# Patient Record
Sex: FEMALE | Race: WHITE | NOT HISPANIC OR LATINO | URBAN - METROPOLITAN AREA
[De-identification: names, ages, dates, MRNs, and addresses within clinical notes are randomized per-mention and may not be internally consistent; named-entity substitution may affect disease eponyms.]

---

## 2017-05-22 ENCOUNTER — CONVERSION ENCOUNTER (OUTPATIENT)
Dept: GENERAL RADIOLOGY | Facility: HOSPITAL | Age: 42
End: 2017-05-22

## 2019-04-15 ENCOUNTER — HOSPITAL ENCOUNTER (OUTPATIENT)
Dept: ULTRASOUND IMAGING | Facility: HOSPITAL | Age: 44
Discharge: HOME | End: 2019-04-15

## 2024-05-02 ENCOUNTER — TRANSCRIBE ORDERS (OUTPATIENT)
Dept: ADMINISTRATIVE | Facility: HOSPITAL | Age: 49
End: 2024-05-02
Payer: COMMERCIAL

## 2024-05-02 DIAGNOSIS — N83.292 COMPLEX CYST OF LEFT OVARY: ICD-10-CM

## 2024-05-02 DIAGNOSIS — R93.5 ABNORMAL ABDOMINAL ULTRASOUND: Primary | ICD-10-CM

## 2024-05-02 DIAGNOSIS — R87.610 ATYP SQUAM CELL OF UNDET SIGNFC CYTO SMR CRVX (ASC-US): ICD-10-CM

## 2024-05-02 DIAGNOSIS — R93.5 ABNORMAL CT OF THE ABDOMEN: ICD-10-CM

## 2024-05-08 ENCOUNTER — HOSPITAL ENCOUNTER (OUTPATIENT)
Dept: PET IMAGING | Facility: HOSPITAL | Age: 49
Discharge: HOME | End: 2024-05-08
Payer: COMMERCIAL

## 2024-05-08 DIAGNOSIS — N83.292 COMPLEX CYST OF LEFT OVARY: ICD-10-CM

## 2024-05-08 DIAGNOSIS — R87.610 ATYP SQUAM CELL OF UNDET SIGNFC CYTO SMR CRVX (ASC-US): ICD-10-CM

## 2024-05-08 DIAGNOSIS — R93.5 ABNORMAL CT OF THE ABDOMEN: ICD-10-CM

## 2024-05-08 PROCEDURE — 0 FLUDEOXYGLUCOSE F18 SOLUTION: Performed by: NURSE PRACTITIONER

## 2024-05-08 PROCEDURE — A9552 F18 FDG: HCPCS | Performed by: NURSE PRACTITIONER

## 2024-05-08 PROCEDURE — 78815 PET IMAGE W/CT SKULL-THIGH: CPT

## 2024-05-08 RX ADMIN — FLUDEOXYGLUCOSE F 18 1 DOSE: 200 INJECTION, SOLUTION INTRAVENOUS at 14:09

## 2024-05-09 ENCOUNTER — HOSPITAL ENCOUNTER (OUTPATIENT)
Dept: OTHER | Facility: HOSPITAL | Age: 49
Discharge: HOME | End: 2024-05-09

## 2024-05-21 ENCOUNTER — APPOINTMENT (OUTPATIENT)
Facility: HOSPITAL | Age: 49
End: 2024-05-21
Payer: COMMERCIAL

## 2024-05-21 ENCOUNTER — HOSPITAL ENCOUNTER (EMERGENCY)
Facility: HOSPITAL | Age: 49
Discharge: HOME | End: 2024-05-21
Attending: STUDENT IN AN ORGANIZED HEALTH CARE EDUCATION/TRAINING PROGRAM | Admitting: STUDENT IN AN ORGANIZED HEALTH CARE EDUCATION/TRAINING PROGRAM
Payer: COMMERCIAL

## 2024-05-21 VITALS
HEART RATE: 76 BPM | HEIGHT: 63 IN | RESPIRATION RATE: 18 BRPM | TEMPERATURE: 98.3 F | SYSTOLIC BLOOD PRESSURE: 132 MMHG | BODY MASS INDEX: 31.18 KG/M2 | DIASTOLIC BLOOD PRESSURE: 75 MMHG | OXYGEN SATURATION: 99 % | WEIGHT: 176 LBS

## 2024-05-21 DIAGNOSIS — N83.202 CYST OF LEFT OVARY: Primary | ICD-10-CM

## 2024-05-21 DIAGNOSIS — R10.2 PELVIC PAIN: ICD-10-CM

## 2024-05-21 LAB
ALBUMIN SERPL-MCNC: 4.2 G/DL (ref 3.5–5.2)
ALBUMIN/GLOB SERPL: 1.1 G/DL
ALP SERPL-CCNC: 76 U/L (ref 39–117)
ALT SERPL W P-5'-P-CCNC: 10 U/L (ref 1–33)
ANION GAP SERPL CALCULATED.3IONS-SCNC: 13.5 MMOL/L (ref 5–15)
AST SERPL-CCNC: 23 U/L (ref 1–32)
BACTERIA UR QL AUTO: ABNORMAL /HPF
BASOPHILS # BLD AUTO: 0.06 10*3/MM3 (ref 0–0.2)
BASOPHILS NFR BLD AUTO: 0.7 % (ref 0–1.5)
BILIRUB SERPL-MCNC: 0.3 MG/DL (ref 0–1.2)
BILIRUB UR QL STRIP: NEGATIVE
BUN SERPL-MCNC: 11 MG/DL (ref 6–20)
BUN/CREAT SERPL: 12.8 (ref 7–25)
CALCIUM SPEC-SCNC: 9.2 MG/DL (ref 8.6–10.5)
CHLORIDE SERPL-SCNC: 99 MMOL/L (ref 98–107)
CLARITY UR: ABNORMAL
CO2 SERPL-SCNC: 24.5 MMOL/L (ref 22–29)
COLOR UR: YELLOW
CREAT SERPL-MCNC: 0.86 MG/DL (ref 0.57–1)
DEPRECATED RDW RBC AUTO: 48.9 FL (ref 37–54)
EGFRCR SERPLBLD CKD-EPI 2021: 81.9 ML/MIN/1.73
EOSINOPHIL # BLD AUTO: 0.28 10*3/MM3 (ref 0–0.4)
EOSINOPHIL NFR BLD AUTO: 3.5 % (ref 0.3–6.2)
ERYTHROCYTE [DISTWIDTH] IN BLOOD BY AUTOMATED COUNT: 16.7 % (ref 12.3–15.4)
GLOBULIN UR ELPH-MCNC: 3.8 GM/DL
GLUCOSE SERPL-MCNC: 94 MG/DL (ref 65–99)
GLUCOSE UR STRIP-MCNC: NEGATIVE MG/DL
HCT VFR BLD AUTO: 33.8 % (ref 34–46.6)
HGB BLD-MCNC: 10.8 G/DL (ref 12–15.9)
HGB UR QL STRIP.AUTO: ABNORMAL
HYALINE CASTS UR QL AUTO: ABNORMAL /LPF
IMM GRANULOCYTES # BLD AUTO: 0.01 10*3/MM3 (ref 0–0.05)
IMM GRANULOCYTES NFR BLD AUTO: 0.1 % (ref 0–0.5)
KETONES UR QL STRIP: ABNORMAL
LEUKOCYTE ESTERASE UR QL STRIP.AUTO: ABNORMAL
LYMPHOCYTES # BLD AUTO: 2.09 10*3/MM3 (ref 0.7–3.1)
LYMPHOCYTES NFR BLD AUTO: 25.9 % (ref 19.6–45.3)
MCH RBC QN AUTO: 25.5 PG (ref 26.6–33)
MCHC RBC AUTO-ENTMCNC: 32 G/DL (ref 31.5–35.7)
MCV RBC AUTO: 79.9 FL (ref 79–97)
MONOCYTES # BLD AUTO: 0.61 10*3/MM3 (ref 0.1–0.9)
MONOCYTES NFR BLD AUTO: 7.6 % (ref 5–12)
MUCOUS THREADS URNS QL MICRO: ABNORMAL /HPF
NEUTROPHILS NFR BLD AUTO: 5.01 10*3/MM3 (ref 1.7–7)
NEUTROPHILS NFR BLD AUTO: 62.2 % (ref 42.7–76)
NITRITE UR QL STRIP: NEGATIVE
PH UR STRIP.AUTO: 6 [PH] (ref 5–8)
PLATELET # BLD AUTO: 529 10*3/MM3 (ref 140–450)
PMV BLD AUTO: 10 FL (ref 6–12)
POTASSIUM SERPL-SCNC: 3.5 MMOL/L (ref 3.5–5.2)
PROT SERPL-MCNC: 8 G/DL (ref 6–8.5)
PROT UR QL STRIP: NEGATIVE
RBC # BLD AUTO: 4.23 10*6/MM3 (ref 3.77–5.28)
RBC # UR STRIP: ABNORMAL /HPF
REF LAB TEST METHOD: ABNORMAL
SODIUM SERPL-SCNC: 137 MMOL/L (ref 136–145)
SP GR UR STRIP: >=1.03 (ref 1–1.03)
SQUAMOUS #/AREA URNS HPF: ABNORMAL /HPF
UROBILINOGEN UR QL STRIP: ABNORMAL
WBC # UR STRIP: ABNORMAL /HPF
WBC NRBC COR # BLD AUTO: 8.06 10*3/MM3 (ref 3.4–10.8)

## 2024-05-21 PROCEDURE — 25810000003 SODIUM CHLORIDE 0.9 % SOLUTION

## 2024-05-21 PROCEDURE — 76830 TRANSVAGINAL US NON-OB: CPT

## 2024-05-21 PROCEDURE — 80053 COMPREHEN METABOLIC PANEL: CPT

## 2024-05-21 PROCEDURE — 93976 VASCULAR STUDY: CPT

## 2024-05-21 PROCEDURE — 81001 URINALYSIS AUTO W/SCOPE: CPT

## 2024-05-21 PROCEDURE — 36415 COLL VENOUS BLD VENIPUNCTURE: CPT

## 2024-05-21 PROCEDURE — 99284 EMERGENCY DEPT VISIT MOD MDM: CPT

## 2024-05-21 PROCEDURE — 25010000002 MORPHINE PER 10 MG: Performed by: STUDENT IN AN ORGANIZED HEALTH CARE EDUCATION/TRAINING PROGRAM

## 2024-05-21 PROCEDURE — 85025 COMPLETE CBC W/AUTO DIFF WBC: CPT

## 2024-05-21 PROCEDURE — 96374 THER/PROPH/DIAG INJ IV PUSH: CPT

## 2024-05-21 RX ORDER — LOSARTAN POTASSIUM AND HYDROCHLOROTHIAZIDE 25; 100 MG/1; MG/1
1 TABLET ORAL DAILY
COMMUNITY

## 2024-05-21 RX ORDER — VENLAFAXINE HYDROCHLORIDE 150 MG/1
150 CAPSULE, EXTENDED RELEASE ORAL DAILY
COMMUNITY

## 2024-05-21 RX ORDER — MAGNESIUM CARB/ALUMINUM HYDROX 105-160MG
30 TABLET,CHEWABLE ORAL ONCE
COMMUNITY

## 2024-05-21 RX ORDER — AMOXICILLIN 250 MG
1 CAPSULE ORAL DAILY
COMMUNITY

## 2024-05-21 RX ORDER — BUSPIRONE HYDROCHLORIDE 5 MG/1
5 TABLET ORAL 3 TIMES DAILY
COMMUNITY

## 2024-05-21 RX ORDER — CETIRIZINE HYDROCHLORIDE 10 MG/1
10 TABLET ORAL DAILY
COMMUNITY

## 2024-05-21 RX ORDER — MOMETASONE FUROATE 1 MG/G
1 CREAM TOPICAL DAILY
COMMUNITY

## 2024-05-21 RX ORDER — POTASSIUM CHLORIDE 750 MG/1
10 TABLET, EXTENDED RELEASE ORAL 2 TIMES DAILY
COMMUNITY

## 2024-05-21 RX ADMIN — SODIUM CHLORIDE 1000 ML: 9 INJECTION, SOLUTION INTRAVENOUS at 18:59

## 2024-05-21 RX ADMIN — MORPHINE SULFATE 4 MG: 4 INJECTION, SOLUTION INTRAMUSCULAR; INTRAVENOUS at 19:00

## 2024-05-21 NOTE — FSED PROVIDER NOTE
Subjective   History of Present Illness  Patient is a 49-year-old female complaining of left pelvic pain ongoing for several days has been gradually worsening.  Patient reports that she has an appointment with OB/GYN on Monday, 5/23/2024 for evaluation of left ovarian cyst which has been increasingly growing in size.  She states she believes the cyst has ruptured and she noticed markedly increased pain this date and states she has had previous ovarian cyst ruptures in the past and this feels similar.  She reports some nausea but denies any vomiting describes as a cramping sensation rated 7 out of 10 in the left lower quadrant states pain does not radiate.  Reports spotting despite being postmenopausal but denies any significant vaginal bleeding.  Denies any urinary symptoms denies any diarrhea or constipation.  Denies any fevers, chills, chest pain or shortness of breath.  Review of Systems   Gastrointestinal:  Positive for abdominal pain and nausea.   Genitourinary:  Positive for pelvic pain and vaginal bleeding.   All other systems reviewed and are negative.      Past Medical History:   Diagnosis Date    Hyperlipidemia     Hypertension        No Known Allergies    Past Surgical History:   Procedure Laterality Date    TUMOR EXCISION      multiple locations       History reviewed. No pertinent family history.    Social History     Socioeconomic History    Marital status:    Tobacco Use    Smoking status: Never    Smokeless tobacco: Never   Substance and Sexual Activity    Alcohol use: Yes     Comment: socially    Drug use: Never           Objective   Physical Exam  General: Afebrile, nontoxic, no acute distress  HEENT: Normocephalic, atraumatic, PERRL.   NECK: Trachea midline no sign of trauma, neck supple, no lymphadenopathy.  CHEST: No signs of trauma   HEART:  no rubs or murmurs on auscultation.  LUNGS: Clear lung sounds bilaterally, no wheezes, no stridor.  Equal lung sounds.  ABDOMEN: Soft, left lower  quadrant pain on palpation, no signs of trauma.  MSK: Moving all extremities, No signs of deformity, edema or joint effusion.  SKIN: No rashes, no erythema.  No wounds  NEURO: Awake alert and oriented, answering all questions and following all commands appropriately.  No focal neurodeficits.   Procedures           ED Course  ED Course as of 05/21/24 2012 Tue May 21, 2024   2011 Patient is a 49-year-old female complaining of left pelvic pain ongoing for several days has been gradually worsening.  Patient reports that she has an appointment with OB/GYN on Monday, 5/23/2024 for evaluation of left ovarian cyst which has been increasingly growing in size.  She states she believes the cyst has ruptured and she noticed markedly increased pain this date and states she has had previous ovarian cyst ruptures in the past and this feels similar.  She reports some nausea but denies any vomiting describes as a cramping sensation rated 7 out of 10 in the left lower quadrant states pain does not radiate.  Reports spotting despite being postmenopausal but denies any significant vaginal bleeding.  Denies any urinary symptoms denies any diarrhea or constipation.  Denies any fevers, chills, chest pain or shortness of breath.  Physical exam did show increased pain on palpation of the left lower abdominal quadrant.  Given known cysts and fibroids transvaginal ultrasound was ordered which showed stable cyst and 2 uterine fibroids which are known conditions.  Plan Estrace and morphine patient states her pain feels significant better.  Patient advised to keep her follow-up with OB/GYN but to return to the ER solution or worsen or she feels she is further can evaluation by ER provider. [ZG]      ED Course User Index  [ZG] Carlos Riggs PA-C                                           Medical Decision Making  Problems Addressed:  Cyst of left ovary: complicated acute illness or injury  Pelvic pain: complicated acute illness or  injury    Amount and/or Complexity of Data Reviewed  Labs: ordered.  Radiology: ordered.    Risk  Prescription drug management.        Final diagnoses:   Cyst of left ovary   Pelvic pain       ED Disposition  ED Disposition       ED Disposition   Discharge    Condition   Stable    Comment   --               No follow-up provider specified.       Medication List      No changes were made to your prescriptions during this visit.

## 2024-05-22 NOTE — DISCHARGE INSTRUCTIONS
Patient to keep follow-up appoint with your OB/GYN this Thursday.  Return to the ER if symptoms return or worsen or if any further can evaluation by ER provider.

## 2025-02-06 ENCOUNTER — TRANSCRIBE ORDERS (OUTPATIENT)
Dept: ADMINISTRATIVE | Facility: HOSPITAL | Age: 50
End: 2025-02-06
Payer: COMMERCIAL

## 2025-02-06 DIAGNOSIS — K80.20 CALCULUS OF GALLBLADDER WITHOUT CHOLECYSTITIS WITHOUT OBSTRUCTION: Primary | ICD-10-CM

## 2025-02-20 ENCOUNTER — HOSPITAL ENCOUNTER (OUTPATIENT)
Dept: NUCLEAR MEDICINE | Facility: HOSPITAL | Age: 50
Discharge: HOME | End: 2025-02-20
Payer: COMMERCIAL

## 2025-02-20 DIAGNOSIS — K80.20 CALCULUS OF GALLBLADDER WITHOUT CHOLECYSTITIS WITHOUT OBSTRUCTION: ICD-10-CM

## 2025-02-20 PROCEDURE — 78227 HEPATOBIL SYST IMAGE W/DRUG: CPT

## 2025-02-20 PROCEDURE — A9537 TC99M MEBROFENIN: HCPCS | Performed by: NURSE PRACTITIONER

## 2025-02-20 PROCEDURE — 34310000005 TECHNETIUM TC 99M MEBROFENIN KIT: Performed by: NURSE PRACTITIONER

## 2025-02-20 RX ORDER — KIT FOR THE PREPARATION OF TECHNETIUM TC 99M MEBROFENIN 45 MG/10ML
1 INJECTION, POWDER, LYOPHILIZED, FOR SOLUTION INTRAVENOUS
Status: COMPLETED | OUTPATIENT
Start: 2025-02-20 | End: 2025-02-20

## 2025-02-20 RX ADMIN — MEBROFENIN 1 DOSE: 45 INJECTION, POWDER, LYOPHILIZED, FOR SOLUTION INTRAVENOUS at 09:00

## 2025-05-15 ENCOUNTER — OFFICE VISIT (OUTPATIENT)
Dept: SURGERY | Facility: CLINIC | Age: 50
End: 2025-05-15
Payer: COMMERCIAL

## 2025-05-15 VITALS
RESPIRATION RATE: 20 BRPM | HEART RATE: 94 BPM | HEIGHT: 63 IN | DIASTOLIC BLOOD PRESSURE: 68 MMHG | WEIGHT: 172.1 LBS | SYSTOLIC BLOOD PRESSURE: 128 MMHG | BODY MASS INDEX: 30.49 KG/M2

## 2025-05-15 DIAGNOSIS — K82.4 GALLBLADDER POLYP: Primary | ICD-10-CM

## 2025-05-15 RX ORDER — METOPROLOL TARTRATE 50 MG
50 TABLET ORAL
COMMUNITY
Start: 2025-01-29

## 2025-05-15 RX ORDER — OMEPRAZOLE 40 MG/1
40 CAPSULE, DELAYED RELEASE ORAL DAILY
COMMUNITY

## 2025-05-15 RX ORDER — MELOXICAM 15 MG/1
1 TABLET ORAL DAILY
COMMUNITY
Start: 2025-02-26

## 2025-05-15 RX ORDER — ONDANSETRON 4 MG/1
4 TABLET, ORALLY DISINTEGRATING ORAL
COMMUNITY
Start: 2025-02-07

## 2025-05-15 RX ORDER — TRAZODONE HYDROCHLORIDE 50 MG/1
50 TABLET ORAL DAILY
COMMUNITY
Start: 2025-04-17

## 2025-05-15 NOTE — LETTER
May 16, 2025     GAIL Barreto  [address]  [facility]  [address]  [place] [address]    Patient: Kayleigh Evans   YOB: 1975   Date of Visit: 5/15/2025     Dear GAIL Barreto:       Thank you for referring Kayleigh Evans to me for evaluation. Below are the relevant portions of my assessment and plan of care.    If you have questions, please do not hesitate to call me. I look forward to following Kayleigh along with you.         Sincerely,        Fransisco Tidwell MD        CC: No Recipients    Fransisco Tidwell MD  05/16/25 1359  Sign when Signing Visit  General Surgery/Colorectal Surgery Note    Patient Name:  Kayleigh Evans  YOB: 1975  [id number]    Referring Provider: Melvi Cutler APRN      Patient Care Team:  Melvi Cutler APRN as PCP - General (Nurse Practitioner)    Chief complaint gallbladder    Subjective.     History of present illness:    She has a history of right mid quadrant abdominal pain for 1 year with associated nausea.  It is intermittent.  No relationship to food intake.  No history of ulcer disease.  No NSAID abuse.  No blood thinner use.  No previous abdominal surgery.  No tobacco use.  No recent chest pain.  Ultrasound right upper quadrant 4/17/2025 with 4 mm gallbladder polyp.      History:  Past Medical History:   Diagnosis Date   • Anemia    • Cholelithiasis    • Hyperlipidemia    • Hypertension    • PONV (postoperative nausea and vomiting)        Past Surgical History:   Procedure Laterality Date   • BREAST BIOPSY      Left   • TUMOR EXCISION      multiple locations       Family History   Problem Relation Age of Onset   • Arthritis Mother    • Depression Mother    • Cancer Father    • Hypertension Father    • Cancer Paternal Grandmother        Social History     Tobacco Use   • Smoking status: Never   • Smokeless tobacco: Never   Vaping Use   • Vaping status: Never Used   Substance  Use Topics   • Alcohol use: Not Currently     Comment: socially   • Drug use: Never       Review of Systems  All systems were reviewed and negative except for:   Review of Systems   Constitutional: Negative for chills, fever and unexpected weight loss.   HENT: Negative for congestion, nosebleeds and voice change.    Eyes: Negative for blurred vision, double vision and discharge.   Respiratory: Negative for apnea, chest tightness and shortness of breath.    Cardiovascular: Negative for chest pain and leg swelling.   Gastrointestinal:        See HPI   Endocrine: Negative for cold intolerance and heat intolerance.   Genitourinary: Negative for dysuria, hematuria and urgency.   Musculoskeletal: Negative for back pain, joint swelling and neck pain.   Skin: Negative for color change and dry skin.   Neurological: Negative for dizziness and confusion.   Hematological: Negative for adenopathy.   Psychiatric/Behavioral: Negative for agitation and behavioral problems.     MEDS:  Prior to Admission medications    Medication Sig Start Date End Date Taking? Authorizing Provider   busPIRone (BUSPAR) 5 MG tablet Take 1 tablet by mouth 3 (Three) Times a Day.   Yes Romeo Quinonez MD   venlafaxine XR (EFFEXOR-XR) 150 MG 24 hr capsule Take 1 capsule by mouth Daily.   Yes Romeo Quinonez MD   cetirizine (zyrTEC) 10 MG tablet Take 1 tablet by mouth Daily.  Patient not taking: Reported on 5/15/2025    Romeo Quinonez MD   diclofenac (VOLTAREN) 50 MG EC tablet Take 1 tablet by mouth.  Patient not taking: Reported on 5/15/2025 4/9/25   Romeo Quinnoez MD   losartan-hydrochlorothiazide (HYZAAR) 100-25 MG per tablet Take 1 tablet by mouth Daily.  Patient not taking: Reported on 5/15/2025    Romeo Quinonez MD   magnesium citrate 1.745 GM/30ML solution solution Take 30 mL by mouth 1 (One) Time.  Patient not taking: Reported on 5/15/2025    Romeo Quinonez MD   meloxicam (MOBIC) 15 MG tablet Take 1 tablet by  mouth Daily.  Patient not taking: Reported on 5/15/2025 2/26/25   Romeo Quinonez MD   metoprolol tartrate (LOPRESSOR) 50 MG tablet Take 1 tablet by mouth.  Patient not taking: Reported on 5/15/2025 1/29/25   Romeo Quinonez MD   mometasone (ELOCON) 0.1 % cream Apply 1 Application topically to the appropriate area as directed Daily.  Patient not taking: Reported on 5/15/2025    Romeo Quinonez MD   omeprazole (priLOSEC) 40 MG capsule Take 1 capsule by mouth Daily.  Patient not taking: Reported on 5/15/2025    Romeo Quinonez MD   ondansetron ODT (ZOFRAN-ODT) 4 MG disintegrating tablet Take 1 tablet by mouth.  Patient not taking: Reported on 5/15/2025 2/7/25   Romeo Quinonez MD   Polyethylene Glycol 3350 (GLYCOLAX PO) Take  by mouth.  Patient not taking: Reported on 5/15/2025    Romeo Quinonez MD   potassium chloride (KLOR-CON M10) 10 MEQ CR tablet Take 1 tablet by mouth 2 (Two) Times a Day.  Patient not taking: Reported on 5/15/2025    Romeo Quinonez MD   sennosides-docusate (senna-docusate sodium) 8.6-50 MG per tablet Take 1 tablet by mouth Daily.  Patient not taking: Reported on 5/15/2025    Romeo Quinonez MD   traZODone (DESYREL) 50 MG tablet Take 1 tablet by mouth Daily.  Patient not taking: Reported on 5/15/2025 4/17/25   Romeo Quinonez MD        Allergies:  Nickel    Objective    Vital Signs   Heart Rate:  [94] 94  Resp:  [20] 20  BP: (128)/(68) 128/68    Physical Exam:     General Appearance:    Alert, cooperative, in no acute distress   Head:    Normocephalic, without obvious abnormality, atraumatic   Eyes:          Conjunctivae and sclerae normal, no icterus,     Ears:    Ears appear intact with no abnormalities noted   Throat:   No oral lesions, no thrush, oral mucosa moist   Neck:   No adenopathy, supple, trachea midline, no thyromegaly   Back:     No kyphosis present, no scoliosis present, no skin lesions,      erythema or scars, no tenderness to  "percussion or                   palpation,   range of motion normal   Lungs:     Clear to auscultation,respirations regular, even and                  unlabored    Heart:    Regular rhythm and normal rate, normal S1 and S2, no            murmur, no gallop, no rub, no click   Chest Wall:    No abnormalities observed   Abdomen:     Normal bowel sounds, no masses, no organomegaly, soft        non-tender, non-distended, no guarding, no rebound                tenderness   Rectal:        Extremities:   Moves all extremities well, no edema, no cyanosis, no             redness   Pulses:   Pulses palpable and equal bilaterally   Skin:   No bleeding, bruising or rash   Lymph nodes:   No palpable adenopathy   Neurologic:   A/o x 4 with no deficits       Results Review:   {Results Review:83804::\"I reviewed the patient's new clinical results.\"    LABS/IMAGING:  Results for orders placed or performed during the hospital encounter of 05/21/24   Comprehensive Metabolic Panel    Collection Time: 05/21/24  6:32 PM    Specimen: Blood   Result Value Ref Range    Glucose 94 65 - 99 mg/dL    BUN 11 6 - 20 mg/dL    Creatinine 0.86 0.57 - 1.00 mg/dL    Sodium 137 136 - 145 mmol/L    Potassium 3.5 3.5 - 5.2 mmol/L    Chloride 99 98 - 107 mmol/L    CO2 24.5 22.0 - 29.0 mmol/L    Calcium 9.2 8.6 - 10.5 mg/dL    Total Protein 8.0 6.0 - 8.5 g/dL    Albumin 4.2 3.5 - 5.2 g/dL    ALT (SGPT) 10 1 - 33 U/L    AST (SGOT) 23 1 - 32 U/L    Alkaline Phosphatase 76 39 - 117 U/L    Total Bilirubin 0.3 0.0 - 1.2 mg/dL    Globulin 3.8 gm/dL    A/G Ratio 1.1 g/dL    BUN/Creatinine Ratio 12.8 7.0 - 25.0    Anion Gap 13.5 5.0 - 15.0 mmol/L    eGFR 81.9 >60.0 mL/min/1.73   Urinalysis With Microscopic If Indicated (No Culture) - Urine, Clean Catch    Collection Time: 05/21/24  6:32 PM    Specimen: Urine, Clean Catch   Result Value Ref Range    Color, UA Yellow Yellow, Straw    Appearance, UA Slightly Cloudy (A) Clear    pH, UA 6.0 5.0 - 8.0    Specific Gravity, " UA >=1.030 1.005 - 1.030    Glucose, UA Negative Negative    Ketones, UA 15 mg/dL (1+) (A) Negative    Bilirubin, UA Negative Negative    Blood, UA Large (3+) (A) Negative    Protein, UA Negative Negative    Leuk Esterase, UA Small (1+) (A) Negative    Nitrite, UA Negative Negative    Urobilinogen, UA 0.2 E.U./dL 0.2 - 1.0 E.U./dL   CBC Auto Differential    Collection Time: 05/21/24  6:32 PM    Specimen: Blood   Result Value Ref Range    WBC 8.06 3.40 - 10.80 10*3/mm3    RBC 4.23 3.77 - 5.28 10*6/mm3    Hemoglobin 10.8 (L) 12.0 - 15.9 g/dL    Hematocrit 33.8 (L) 34.0 - 46.6 %    MCV 79.9 79.0 - 97.0 fL    MCH 25.5 (L) 26.6 - 33.0 pg    MCHC 32.0 31.5 - 35.7 g/dL    RDW 16.7 (H) 12.3 - 15.4 %    RDW-SD 48.9 37.0 - 54.0 fl    MPV 10.0 6.0 - 12.0 fL    Platelets 529 (H) 140 - 450 10*3/mm3    Neutrophil % 62.2 42.7 - 76.0 %    Lymphocyte % 25.9 19.6 - 45.3 %    Monocyte % 7.6 5.0 - 12.0 %    Eosinophil % 3.5 0.3 - 6.2 %    Basophil % 0.7 0.0 - 1.5 %    Immature Grans % 0.1 0.0 - 0.5 %    Neutrophils, Absolute 5.01 1.70 - 7.00 10*3/mm3    Lymphocytes, Absolute 2.09 0.70 - 3.10 10*3/mm3    Monocytes, Absolute 0.61 0.10 - 0.90 10*3/mm3    Eosinophils, Absolute 0.28 0.00 - 0.40 10*3/mm3    Basophils, Absolute 0.06 0.00 - 0.20 10*3/mm3    Immature Grans, Absolute 0.01 0.00 - 0.05 10*3/mm3   Urinalysis, Microscopic Only - Urine, Clean Catch    Collection Time: 05/21/24  6:32 PM    Specimen: Urine, Clean Catch   Result Value Ref Range    RBC, UA 3-5 (A) None Seen, 0-2 /HPF    WBC, UA 11-20 (A) None Seen, 0-2 /HPF    Bacteria, UA 2+ (A) None Seen /HPF    Squamous Epithelial Cells, UA 7-12 (A) None Seen, 0-2 /HPF    Hyaline Casts, UA None Seen None Seen /LPF    Mucus, UA Large/3+ (A) None Seen, Trace /HPF    Methodology Manual Light Microscopy         Result Review: Labs  Result Review  Imaging  Med Tab  Media     Assessment & Plan    Gallbladder polyp    Discussion with patient.  I reviewed her imaging with results  mentioned above.  We discussed surveillance with ultrasound in 6 months versus removing her gallbladder.  She wishes to proceed with surveillance imaging.  Ultrasound for right upper quadrant ordered for 6 months.  I explained to her if she develops right upper quadrant pain worse with eating in the meantime to follow-up with me and we will consider surgery.  If the polyp increases in size I will recommend surgery.  All questions answered.  She agrees with the plan.  Thank you for the consult.              This document has been electronically signed by Fransisco Tidwell MD  May 16, 2025 13:56 EDT

## 2025-05-16 NOTE — PROGRESS NOTES
General Surgery/Colorectal Surgery Note    Patient Name:  Kayleigh Evans  YOB: 1975  [id number]    Referring Provider: Melvi Cutler APRN      Patient Care Team:  Melvi Cutler APRN as PCP - General (Nurse Practitioner)    Chief complaint gallbladder    Subjective .     History of present illness:    She has a history of right mid quadrant abdominal pain for 1 year with associated nausea.  It is intermittent.  No relationship to food intake.  No history of ulcer disease.  No NSAID abuse.  No blood thinner use.  No previous abdominal surgery.  No tobacco use.  No recent chest pain.  Ultrasound right upper quadrant 4/17/2025 with 4 mm gallbladder polyp.      History:  Past Medical History:   Diagnosis Date    Anemia     Cholelithiasis     Hyperlipidemia     Hypertension     PONV (postoperative nausea and vomiting)        Past Surgical History:   Procedure Laterality Date    BREAST BIOPSY      Left    TUMOR EXCISION      multiple locations       Family History   Problem Relation Age of Onset    Arthritis Mother     Depression Mother     Cancer Father     Hypertension Father     Cancer Paternal Grandmother        Social History     Tobacco Use    Smoking status: Never    Smokeless tobacco: Never   Vaping Use    Vaping status: Never Used   Substance Use Topics    Alcohol use: Not Currently     Comment: socially    Drug use: Never       Review of Systems  All systems were reviewed and negative except for:   Review of Systems   Constitutional: Negative for chills, fever and unexpected weight loss.   HENT: Negative for congestion, nosebleeds and voice change.    Eyes: Negative for blurred vision, double vision and discharge.   Respiratory: Negative for apnea, chest tightness and shortness of breath.    Cardiovascular: Negative for chest pain and leg swelling.   Gastrointestinal:        See HPI   Endocrine: Negative for cold intolerance and heat intolerance.   Genitourinary: Negative for  dysuria, hematuria and urgency.   Musculoskeletal: Negative for back pain, joint swelling and neck pain.   Skin: Negative for color change and dry skin.   Neurological: Negative for dizziness and confusion.   Hematological: Negative for adenopathy.   Psychiatric/Behavioral: Negative for agitation and behavioral problems.     MEDS:  Prior to Admission medications    Medication Sig Start Date End Date Taking? Authorizing Provider   busPIRone (BUSPAR) 5 MG tablet Take 1 tablet by mouth 3 (Three) Times a Day.   Yes Romeo Quinonez MD   venlafaxine XR (EFFEXOR-XR) 150 MG 24 hr capsule Take 1 capsule by mouth Daily.   Yes Romeo Quinonez MD   cetirizine (zyrTEC) 10 MG tablet Take 1 tablet by mouth Daily.  Patient not taking: Reported on 5/15/2025    Romeo Quinonez MD   diclofenac (VOLTAREN) 50 MG EC tablet Take 1 tablet by mouth.  Patient not taking: Reported on 5/15/2025 4/9/25   Romeo Quinonez MD   losartan-hydrochlorothiazide (HYZAAR) 100-25 MG per tablet Take 1 tablet by mouth Daily.  Patient not taking: Reported on 5/15/2025    Romeo Quinonez MD   magnesium citrate 1.745 GM/30ML solution solution Take 30 mL by mouth 1 (One) Time.  Patient not taking: Reported on 5/15/2025    Romeo Quinonez MD   meloxicam (MOBIC) 15 MG tablet Take 1 tablet by mouth Daily.  Patient not taking: Reported on 5/15/2025 2/26/25   Romeo Quinonez MD   metoprolol tartrate (LOPRESSOR) 50 MG tablet Take 1 tablet by mouth.  Patient not taking: Reported on 5/15/2025 1/29/25   Romeo Quinonez MD   mometasone (ELOCON) 0.1 % cream Apply 1 Application topically to the appropriate area as directed Daily.  Patient not taking: Reported on 5/15/2025    Romeo Quinonez MD   omeprazole (priLOSEC) 40 MG capsule Take 1 capsule by mouth Daily.  Patient not taking: Reported on 5/15/2025    Romeo Quinonez MD   ondansetron ODT (ZOFRAN-ODT) 4 MG disintegrating tablet Take 1 tablet by mouth.  Patient  not taking: Reported on 5/15/2025 2/7/25   Romeo Quinonez MD   Polyethylene Glycol 3350 (GLYCOLAX PO) Take  by mouth.  Patient not taking: Reported on 5/15/2025    Romeo Quinonez MD   potassium chloride (KLOR-CON M10) 10 MEQ CR tablet Take 1 tablet by mouth 2 (Two) Times a Day.  Patient not taking: Reported on 5/15/2025    Romeo Quinonez MD   sennosides-docusate (senna-docusate sodium) 8.6-50 MG per tablet Take 1 tablet by mouth Daily.  Patient not taking: Reported on 5/15/2025    Romeo Quinonez MD   traZODone (DESYREL) 50 MG tablet Take 1 tablet by mouth Daily.  Patient not taking: Reported on 5/15/2025 4/17/25   Romeo Quinonez MD        Allergies:  Nickel    Objective     Vital Signs   Heart Rate:  [94] 94  Resp:  [20] 20  BP: (128)/(68) 128/68    Physical Exam:     General Appearance:    Alert, cooperative, in no acute distress   Head:    Normocephalic, without obvious abnormality, atraumatic   Eyes:          Conjunctivae and sclerae normal, no icterus,     Ears:    Ears appear intact with no abnormalities noted   Throat:   No oral lesions, no thrush, oral mucosa moist   Neck:   No adenopathy, supple, trachea midline, no thyromegaly   Back:     No kyphosis present, no scoliosis present, no skin lesions,      erythema or scars, no tenderness to percussion or                   palpation,   range of motion normal   Lungs:     Clear to auscultation,respirations regular, even and                  unlabored    Heart:    Regular rhythm and normal rate, normal S1 and S2, no            murmur, no gallop, no rub, no click   Chest Wall:    No abnormalities observed   Abdomen:     Normal bowel sounds, no masses, no organomegaly, soft        non-tender, non-distended, no guarding, no rebound                tenderness   Rectal:        Extremities:   Moves all extremities well, no edema, no cyanosis, no             redness   Pulses:   Pulses palpable and equal bilaterally   Skin:   No  "bleeding, bruising or rash   Lymph nodes:   No palpable adenopathy   Neurologic:   A/o x 4 with no deficits       Results Review:   {Results Review:33200::\"I reviewed the patient's new clinical results.\"    LABS/IMAGING:  Results for orders placed or performed during the hospital encounter of 05/21/24   Comprehensive Metabolic Panel    Collection Time: 05/21/24  6:32 PM    Specimen: Blood   Result Value Ref Range    Glucose 94 65 - 99 mg/dL    BUN 11 6 - 20 mg/dL    Creatinine 0.86 0.57 - 1.00 mg/dL    Sodium 137 136 - 145 mmol/L    Potassium 3.5 3.5 - 5.2 mmol/L    Chloride 99 98 - 107 mmol/L    CO2 24.5 22.0 - 29.0 mmol/L    Calcium 9.2 8.6 - 10.5 mg/dL    Total Protein 8.0 6.0 - 8.5 g/dL    Albumin 4.2 3.5 - 5.2 g/dL    ALT (SGPT) 10 1 - 33 U/L    AST (SGOT) 23 1 - 32 U/L    Alkaline Phosphatase 76 39 - 117 U/L    Total Bilirubin 0.3 0.0 - 1.2 mg/dL    Globulin 3.8 gm/dL    A/G Ratio 1.1 g/dL    BUN/Creatinine Ratio 12.8 7.0 - 25.0    Anion Gap 13.5 5.0 - 15.0 mmol/L    eGFR 81.9 >60.0 mL/min/1.73   Urinalysis With Microscopic If Indicated (No Culture) - Urine, Clean Catch    Collection Time: 05/21/24  6:32 PM    Specimen: Urine, Clean Catch   Result Value Ref Range    Color, UA Yellow Yellow, Straw    Appearance, UA Slightly Cloudy (A) Clear    pH, UA 6.0 5.0 - 8.0    Specific Gravity, UA >=1.030 1.005 - 1.030    Glucose, UA Negative Negative    Ketones, UA 15 mg/dL (1+) (A) Negative    Bilirubin, UA Negative Negative    Blood, UA Large (3+) (A) Negative    Protein, UA Negative Negative    Leuk Esterase, UA Small (1+) (A) Negative    Nitrite, UA Negative Negative    Urobilinogen, UA 0.2 E.U./dL 0.2 - 1.0 E.U./dL   CBC Auto Differential    Collection Time: 05/21/24  6:32 PM    Specimen: Blood   Result Value Ref Range    WBC 8.06 3.40 - 10.80 10*3/mm3    RBC 4.23 3.77 - 5.28 10*6/mm3    Hemoglobin 10.8 (L) 12.0 - 15.9 g/dL    Hematocrit 33.8 (L) 34.0 - 46.6 %    MCV 79.9 79.0 - 97.0 fL    MCH 25.5 (L) 26.6 - 33.0 " pg    MCHC 32.0 31.5 - 35.7 g/dL    RDW 16.7 (H) 12.3 - 15.4 %    RDW-SD 48.9 37.0 - 54.0 fl    MPV 10.0 6.0 - 12.0 fL    Platelets 529 (H) 140 - 450 10*3/mm3    Neutrophil % 62.2 42.7 - 76.0 %    Lymphocyte % 25.9 19.6 - 45.3 %    Monocyte % 7.6 5.0 - 12.0 %    Eosinophil % 3.5 0.3 - 6.2 %    Basophil % 0.7 0.0 - 1.5 %    Immature Grans % 0.1 0.0 - 0.5 %    Neutrophils, Absolute 5.01 1.70 - 7.00 10*3/mm3    Lymphocytes, Absolute 2.09 0.70 - 3.10 10*3/mm3    Monocytes, Absolute 0.61 0.10 - 0.90 10*3/mm3    Eosinophils, Absolute 0.28 0.00 - 0.40 10*3/mm3    Basophils, Absolute 0.06 0.00 - 0.20 10*3/mm3    Immature Grans, Absolute 0.01 0.00 - 0.05 10*3/mm3   Urinalysis, Microscopic Only - Urine, Clean Catch    Collection Time: 05/21/24  6:32 PM    Specimen: Urine, Clean Catch   Result Value Ref Range    RBC, UA 3-5 (A) None Seen, 0-2 /HPF    WBC, UA 11-20 (A) None Seen, 0-2 /HPF    Bacteria, UA 2+ (A) None Seen /HPF    Squamous Epithelial Cells, UA 7-12 (A) None Seen, 0-2 /HPF    Hyaline Casts, UA None Seen None Seen /LPF    Mucus, UA Large/3+ (A) None Seen, Trace /HPF    Methodology Manual Light Microscopy         Result Review : Labs  Result Review  Imaging  Med Tab  Media     Assessment & Plan     Gallbladder polyp    Discussion with patient.  I reviewed her imaging with results mentioned above.  We discussed surveillance with ultrasound in 6 months versus removing her gallbladder.  She wishes to proceed with surveillance imaging.  Ultrasound for right upper quadrant ordered for 6 months.  I explained to her if she develops right upper quadrant pain worse with eating in the meantime to follow-up with me and we will consider surgery.  If the polyp increases in size I will recommend surgery.  All questions answered.  She agrees with the plan.  Thank you for the consult.              This document has been electronically signed by Fransisco Tidwell MD  May 16, 2025 13:56 EDT

## 2025-06-02 ENCOUNTER — APPOINTMENT (OUTPATIENT)
Facility: HOSPITAL | Age: 50
End: 2025-06-02
Payer: COMMERCIAL

## 2025-06-02 ENCOUNTER — HOSPITAL ENCOUNTER (EMERGENCY)
Facility: HOSPITAL | Age: 50
Discharge: HOME | End: 2025-06-02
Attending: EMERGENCY MEDICINE | Admitting: EMERGENCY MEDICINE
Payer: COMMERCIAL

## 2025-06-02 VITALS
WEIGHT: 167 LBS | OXYGEN SATURATION: 99 % | HEIGHT: 62 IN | TEMPERATURE: 97.4 F | DIASTOLIC BLOOD PRESSURE: 63 MMHG | SYSTOLIC BLOOD PRESSURE: 97 MMHG | RESPIRATION RATE: 18 BRPM | HEART RATE: 76 BPM | BODY MASS INDEX: 30.73 KG/M2

## 2025-06-02 DIAGNOSIS — K80.20 CALCULUS OF GALLBLADDER WITHOUT CHOLECYSTITIS WITHOUT OBSTRUCTION: ICD-10-CM

## 2025-06-02 DIAGNOSIS — R10.13 EPIGASTRIC PAIN: Primary | ICD-10-CM

## 2025-06-02 DIAGNOSIS — E86.0 DEHYDRATION: ICD-10-CM

## 2025-06-02 LAB
ALBUMIN SERPL-MCNC: 4.2 G/DL (ref 3.5–5.2)
ALBUMIN/GLOB SERPL: 1.1 G/DL
ALP SERPL-CCNC: 84 U/L (ref 39–117)
ALT SERPL W P-5'-P-CCNC: 14 U/L (ref 1–33)
ANION GAP SERPL CALCULATED.3IONS-SCNC: 14.4 MMOL/L (ref 5–15)
AST SERPL-CCNC: 17 U/L (ref 1–32)
BASOPHILS # BLD AUTO: 0.05 10*3/MM3 (ref 0–0.2)
BASOPHILS NFR BLD AUTO: 0.6 % (ref 0–1.5)
BILIRUB SERPL-MCNC: 0.4 MG/DL (ref 0–1.2)
BILIRUB UR QL STRIP: NEGATIVE
BUN SERPL-MCNC: 14.2 MG/DL (ref 6–20)
BUN/CREAT SERPL: 12.6 (ref 7–25)
CALCIUM SPEC-SCNC: 9.9 MG/DL (ref 8.6–10.5)
CHLORIDE SERPL-SCNC: 98 MMOL/L (ref 98–107)
CLARITY UR: ABNORMAL
CO2 SERPL-SCNC: 29.6 MMOL/L (ref 22–29)
COLOR UR: YELLOW
CREAT SERPL-MCNC: 1.13 MG/DL (ref 0.57–1)
DEPRECATED RDW RBC AUTO: 48.5 FL (ref 37–54)
EGFRCR SERPLBLD CKD-EPI 2021: 58.7 ML/MIN/1.73
EOSINOPHIL # BLD AUTO: 0.09 10*3/MM3 (ref 0–0.4)
EOSINOPHIL NFR BLD AUTO: 1.1 % (ref 0.3–6.2)
ERYTHROCYTE [DISTWIDTH] IN BLOOD BY AUTOMATED COUNT: 15.3 % (ref 12.3–15.4)
GLOBULIN UR ELPH-MCNC: 3.8 GM/DL
GLUCOSE SERPL-MCNC: 106 MG/DL (ref 65–99)
GLUCOSE UR STRIP-MCNC: NEGATIVE MG/DL
HCT VFR BLD AUTO: 45.3 % (ref 34–46.6)
HGB BLD-MCNC: 14.8 G/DL (ref 12–15.9)
HGB UR QL STRIP.AUTO: NEGATIVE
IMM GRANULOCYTES # BLD AUTO: 0.01 10*3/MM3 (ref 0–0.05)
IMM GRANULOCYTES NFR BLD AUTO: 0.1 % (ref 0–0.5)
KETONES UR QL STRIP: NEGATIVE
LEUKOCYTE ESTERASE UR QL STRIP.AUTO: NEGATIVE
LIPASE SERPL-CCNC: 41 U/L (ref 13–60)
LYMPHOCYTES # BLD AUTO: 1.97 10*3/MM3 (ref 0.7–3.1)
LYMPHOCYTES NFR BLD AUTO: 24.3 % (ref 19.6–45.3)
MAGNESIUM SERPL-MCNC: 2.3 MG/DL (ref 1.6–2.6)
MCH RBC QN AUTO: 28 PG (ref 26.6–33)
MCHC RBC AUTO-ENTMCNC: 32.7 G/DL (ref 31.5–35.7)
MCV RBC AUTO: 85.8 FL (ref 79–97)
MONOCYTES # BLD AUTO: 0.77 10*3/MM3 (ref 0.1–0.9)
MONOCYTES NFR BLD AUTO: 9.5 % (ref 5–12)
NEUTROPHILS NFR BLD AUTO: 5.21 10*3/MM3 (ref 1.7–7)
NEUTROPHILS NFR BLD AUTO: 64.4 % (ref 42.7–76)
NITRITE UR QL STRIP: NEGATIVE
PH UR STRIP.AUTO: 7 [PH] (ref 5–8)
PLATELET # BLD AUTO: 416 10*3/MM3 (ref 140–450)
PMV BLD AUTO: 9.8 FL (ref 6–12)
POTASSIUM SERPL-SCNC: 3.6 MMOL/L (ref 3.5–5.2)
PROT SERPL-MCNC: 8 G/DL (ref 6–8.5)
PROT UR QL STRIP: NEGATIVE
RBC # BLD AUTO: 5.28 10*6/MM3 (ref 3.77–5.28)
SODIUM SERPL-SCNC: 142 MMOL/L (ref 136–145)
SP GR UR STRIP: 1.01 (ref 1–1.03)
UROBILINOGEN UR QL STRIP: ABNORMAL
WBC NRBC COR # BLD AUTO: 8.1 10*3/MM3 (ref 3.4–10.8)

## 2025-06-02 PROCEDURE — 74177 CT ABD & PELVIS W/CONTRAST: CPT

## 2025-06-02 PROCEDURE — 81003 URINALYSIS AUTO W/O SCOPE: CPT | Performed by: EMERGENCY MEDICINE

## 2025-06-02 PROCEDURE — 99285 EMERGENCY DEPT VISIT HI MDM: CPT | Performed by: EMERGENCY MEDICINE

## 2025-06-02 PROCEDURE — 25510000001 IOPAMIDOL 61 % SOLUTION: Performed by: EMERGENCY MEDICINE

## 2025-06-02 PROCEDURE — 83690 ASSAY OF LIPASE: CPT | Performed by: EMERGENCY MEDICINE

## 2025-06-02 PROCEDURE — 25010000002 METOCLOPRAMIDE PER 10 MG: Performed by: EMERGENCY MEDICINE

## 2025-06-02 PROCEDURE — 96374 THER/PROPH/DIAG INJ IV PUSH: CPT

## 2025-06-02 PROCEDURE — 25810000003 LACTATED RINGERS SOLUTION: Performed by: EMERGENCY MEDICINE

## 2025-06-02 PROCEDURE — 83735 ASSAY OF MAGNESIUM: CPT | Performed by: EMERGENCY MEDICINE

## 2025-06-02 PROCEDURE — 85025 COMPLETE CBC W/AUTO DIFF WBC: CPT | Performed by: EMERGENCY MEDICINE

## 2025-06-02 PROCEDURE — 80053 COMPREHEN METABOLIC PANEL: CPT | Performed by: EMERGENCY MEDICINE

## 2025-06-02 RX ORDER — ONDANSETRON 4 MG/1
4 TABLET, ORALLY DISINTEGRATING ORAL EVERY 6 HOURS PRN
Qty: 12 TABLET | Refills: 0 | Status: SHIPPED | OUTPATIENT
Start: 2025-06-02 | End: 2025-06-14

## 2025-06-02 RX ORDER — IOPAMIDOL 612 MG/ML
100 INJECTION, SOLUTION INTRAVASCULAR
Status: COMPLETED | OUTPATIENT
Start: 2025-06-02 | End: 2025-06-02

## 2025-06-02 RX ORDER — SODIUM CHLORIDE 0.9 % (FLUSH) 0.9 %
10 SYRINGE (ML) INJECTION AS NEEDED
Status: DISCONTINUED | OUTPATIENT
Start: 2025-06-02 | End: 2025-06-02 | Stop reason: HOSPADM

## 2025-06-02 RX ORDER — METOCLOPRAMIDE HYDROCHLORIDE 5 MG/ML
10 INJECTION INTRAMUSCULAR; INTRAVENOUS ONCE
Status: COMPLETED | OUTPATIENT
Start: 2025-06-02 | End: 2025-06-02

## 2025-06-02 RX ADMIN — IOPAMIDOL 85 ML: 612 INJECTION, SOLUTION INTRAVENOUS at 02:05

## 2025-06-02 RX ADMIN — METOCLOPRAMIDE 10 MG: 5 INJECTION, SOLUTION INTRAMUSCULAR; INTRAVENOUS at 01:59

## 2025-06-02 RX ADMIN — SODIUM CHLORIDE, POTASSIUM CHLORIDE, SODIUM LACTATE AND CALCIUM CHLORIDE 1000 ML: 600; 310; 30; 20 INJECTION, SOLUTION INTRAVENOUS at 01:58

## 2025-06-02 NOTE — FSED PROVIDER NOTE
"Subjective  History of Present Illness:    Patient presents to the emergency department with abdominal pain nausea vomiting and constipation.  The patient states she is currently taking a GLP-1 which is significantly decreased her throughput of her gut.  She states that she has not had a bowel movement in 7 days.  Describes dehydration, feeling generally not well.  States that she took a fleets enema last night and was only able to produce a very small stool.  The patient states she continues to be very uncomfortable      Nurses Notes reviewed and agree, including vitals, allergies, social history and prior medical history.     REVIEW OF SYSTEMS: All systems reviewed and not pertinent unless noted.  Review of Systems   Gastrointestinal:  Positive for abdominal pain, constipation, nausea and vomiting.   All other systems reviewed and are negative.      Past Medical History:   Diagnosis Date    Anemia     Cholelithiasis     Hyperlipidemia     Hypertension     PONV (postoperative nausea and vomiting)        Allergies:    Formaldehyde and Nickel      Past Surgical History:   Procedure Laterality Date    BREAST BIOPSY      Left    TUMOR EXCISION      multiple locations         Social History     Socioeconomic History    Marital status:    Tobacco Use    Smoking status: Never    Smokeless tobacco: Never   Vaping Use    Vaping status: Never Used   Substance and Sexual Activity    Alcohol use: Not Currently     Comment: socially    Drug use: Never    Sexual activity: Yes     Partners: Male     Birth control/protection: Hysterectomy         Family History   Problem Relation Age of Onset    Arthritis Mother     Depression Mother     Cancer Father     Hypertension Father     Cancer Paternal Grandmother        Objective  Physical Exam:  BP 97/63   Pulse 76   Temp 97.4 °F (36.3 °C) (Oral)   Resp 18   Ht 157.5 cm (62\")   Wt 75.8 kg (167 lb)   SpO2 99%   BMI 30.54 kg/m²      Physical Exam  Vitals and nursing note " reviewed.   Constitutional:       General: She is not in acute distress.     Appearance: Normal appearance. She is normal weight. She is not ill-appearing, toxic-appearing or diaphoretic.   HENT:      Head: Normocephalic and atraumatic.      Mouth/Throat:      Mouth: Mucous membranes are dry.   Eyes:      Extraocular Movements: Extraocular movements intact.      Conjunctiva/sclera: Conjunctivae normal.      Pupils: Pupils are equal, round, and reactive to light.   Cardiovascular:      Rate and Rhythm: Normal rate and regular rhythm.      Pulses: Normal pulses.      Heart sounds: Normal heart sounds.   Pulmonary:      Effort: Pulmonary effort is normal.      Breath sounds: Normal breath sounds.   Abdominal:      Comments: Soft, nondistended, generalized abdominal tenderness with some localization of the right upper and right lower quadrant.  Guarding.  No rebound.  Normal bowel sounds.   Musculoskeletal:         General: Normal range of motion.   Skin:     General: Skin is warm.      Capillary Refill: Capillary refill takes less than 2 seconds.   Neurological:      General: No focal deficit present.      Mental Status: She is alert and oriented to person, place, and time.   Psychiatric:         Mood and Affect: Mood normal.         Behavior: Behavior normal.         Thought Content: Thought content normal.         Judgment: Judgment normal.         Procedures    ED Course:         Lab Results (last 24 hours)       Procedure Component Value Units Date/Time    CBC & Differential [[id number]]  (Normal) Collected: 06/02/25 0155    Specimen: Blood Updated: 06/02/25 0209    Narrative:      The following orders were created for panel order CBC & Differential.  Procedure                               Abnormality         Status                     ---------                               -----------         ------                     CBC Auto Differential[[id number]]        Normal              Final result                 Please  view results for these tests on the individual orders.    Comprehensive Metabolic Panel [[id number]]  (Abnormal) Collected: 06/02/25 0155    Specimen: Blood Updated: 06/02/25 0225     Glucose 106 mg/dL      BUN 14.2 mg/dL      Creatinine 1.13 mg/dL      Sodium 142 mmol/L      Potassium 3.6 mmol/L      Chloride 98 mmol/L      CO2 29.6 mmol/L      Calcium 9.9 mg/dL      Total Protein 8.0 g/dL      Albumin 4.2 g/dL      ALT (SGPT) 14 U/L      AST (SGOT) 17 U/L      Alkaline Phosphatase 84 U/L      Total Bilirubin 0.4 mg/dL      Globulin 3.8 gm/dL      A/G Ratio 1.1 g/dL      BUN/Creatinine Ratio 12.6     Anion Gap 14.4 mmol/L      eGFR 58.7 mL/min/1.73     Narrative:      GFR Categories in Chronic Kidney Disease (CKD)              GFR Category          GFR (mL/min/1.73)    Interpretation  G1                    90 or greater        Normal or high (1)  G2                    60-89                Mild decrease (1)  G3a                   45-59                Mild to moderate decrease  G3b                   30-44                Moderate to severe decrease  G4                    15-29                Severe decrease  G5                    14 or less           Kidney failure    (1)In the absence of evidence of kidney disease, neither GFR category G1 or G2 fulfill the criteria for CKD.    eGFR calculation 2021 CKD-EPI creatinine equation, which does not include race as a factor    Lipase [[id number]]  (Normal) Collected: 06/02/25 0155    Specimen: Blood Updated: 06/02/25 0225     Lipase 41 U/L     Urinalysis With Microscopic If Indicated (No Culture) - Urine, Clean Catch [[id number]]  (Abnormal) Collected: 06/02/25 0155    Specimen: Urine, Clean Catch Updated: 06/02/25 0210     Color, UA Yellow     Appearance, UA Slightly Cloudy     pH, UA 7.0     Specific Gravity, UA 1.010     Glucose, UA Negative     Ketones, UA Negative     Bilirubin, UA Negative     Blood, UA Negative     Protein, UA Negative     Leuk Esterase, UA Negative      Nitrite, UA Negative     Urobilinogen, UA 1.0 E.U./dL    Narrative:      Urine microscopic not indicated.    Magnesium [[id number]]  (Normal) Collected: 06/02/25 0155    Specimen: Blood Updated: 06/02/25 0225     Magnesium 2.3 mg/dL     CBC Auto Differential [[id number]]  (Normal) Collected: 06/02/25 0155    Specimen: Blood Updated: 06/02/25 0209     WBC 8.10 10*3/mm3      RBC 5.28 10*6/mm3      Hemoglobin 14.8 g/dL      Hematocrit 45.3 %      MCV 85.8 fL      MCH 28.0 pg      MCHC 32.7 g/dL      RDW 15.3 %      RDW-SD 48.5 fl      MPV 9.8 fL      Platelets 416 10*3/mm3      Neutrophil % 64.4 %      Lymphocyte % 24.3 %      Monocyte % 9.5 %      Eosinophil % 1.1 %      Basophil % 0.6 %      Immature Grans % 0.1 %      Neutrophils, Absolute 5.21 10*3/mm3      Lymphocytes, Absolute 1.97 10*3/mm3      Monocytes, Absolute 0.77 10*3/mm3      Eosinophils, Absolute 0.09 10*3/mm3      Basophils, Absolute 0.05 10*3/mm3      Immature Grans, Absolute 0.01 10*3/mm3              CT Abdomen Pelvis With Contrast  Result Date: 6/2/2025  CT ABDOMEN PELVIS W CONTRAST Date of Exam: 6/2/2025 1:58 AM EDT Indication: abd pain, N/V. constipation. Comparison: None available. Technique: Axial CT images were obtained of the abdomen and pelvis following the uneventful intravenous administration of 85 mL Isovue-300. Reconstructed coronal and sagittal images were also obtained. Automated exposure control and iterative construction methods were used. Findings: Lung Bases:   The visualized lung bases and lower mediastinal structures are unremarkable. Liver: Liver is normal in size and CT density. No focal lesions. Biliary/Gallbladder:  There is a stone in the dependent aspect of the gallbladder. There is no evidence of biliary ductal dilatation Spleen: Spleen is normal in size and CT density. Pancreas:  Pancreas is normal. There is no evidence of pancreatic mass or peripancreatic fluid. Kidneys:  Kidneys are normal in size. There are no stones or  hydronephrosis. Adrenals:  Adrenal glands are unremarkable. Retroperitoneal/Lymph Nodes/Vasculature:  No retroperitoneal adenopathy is identified. Gastrointestinal/Mesentery:  The bowel loops are non-dilated without wall thickening or mass. The appendix appears within normal limits. No evidence of obstruction. No free air. No mesenteric fluid collections identified. Bladder:  The bladder is normal. Genital:   Unremarkable       Bony Structures:   Visualized bony structures are consistent with the patient's age.     Impression: Impression: Cholelithiasis. No acute abdominal or pelvic abnormality. Electronically Signed: Yuniel Womack MD  6/2/2025 3:06 AM EDT  Workstation ID: [id number]         MDM  Number of Diagnoses or Management Options  Diagnosis management comments: Patient was evaluated labs are drawn.  Workup was fairly benign.  Slightly dehydrated given a bolus of fluid and the patient states she feels much better.  CT scan was nonconcerning at this time.  Discharged home.  Likely the result of the GLP-1       Amount and/or Complexity of Data Reviewed  Clinical lab tests: reviewed  Tests in the radiology section of CPT®: reviewed        Medications   sodium chloride 0.9 % flush 10 mL (has no administration in time range)   lactated ringers bolus 1,000 mL (1,000 mL Intravenous New Bag 6/2/25 0158)   metoclopramide (REGLAN) injection 10 mg (10 mg Intravenous Given 6/2/25 0159)   iopamidol (ISOVUE-300) 61 % injection 100 mL (85 mL Intravenous Given 6/2/25 0205)       Data interpreted: Nursing notes reviewed, vital signs reviewed.  Labs independently interpreted by me (CBC, CMP, lipase, UA, troponin, ABG, lactic acid, procalcitonin).  Imaging independently interpreted by me (x-ray, CT scan).  EKG independently interpreted by me.  O2 saturation:    Counseling: Discussed the results above with the patient regarding need for admission or discharge.  Patient understands and agrees plan of care.      -----  ED Disposition        ED Disposition   Discharge    Condition   Stable    Comment   --             Final diagnoses:   Epigastric pain   Calculus of gallbladder without cholecystitis without obstruction   Dehydration      Your Follow-Up Providers       Melvi Cutler, APRN. Call in 2 days.    Specialty: Nurse Practitioner  [address]  [facility]  [address]  [place] [address]  [phone]               [facility] EMERGENCY DEPARTMENT [place].    Specialty: Emergency Medicine  Follow up details: As needed  [address] [facility] [address]  [facility] [address] [phone]                     Contact information for after-discharge care    Follow-up information has not been specified.                    Your medication list        CHANGE how you take these medications        Instructions Last Dose Given Next Dose Due   ondansetron ODT 4 MG disintegrating tablet  Commonly known as: ZOFRAN-ODT  What changed:   when to take this  reasons to take this      Take 1 tablet by mouth Every 6 (Six) Hours As Needed for Nausea or Vomiting for up to 12 days.              CONTINUE taking these medications        Instructions Last Dose Given Next Dose Due   busPIRone 5 MG tablet  Commonly known as: BUSPAR      Take 1 tablet by mouth 3 (Three) Times a Day.       cetirizine 10 MG tablet  Commonly known as: zyrTEC      Take 1 tablet by mouth Daily.       diclofenac 50 MG EC tablet  Commonly known as: VOLTAREN      Take 1 tablet by mouth.       GLYCOLAX PO      Take  by mouth.       losartan-hydrochlorothiazide 100-25 MG per tablet  Commonly known as: HYZAAR      Take 1 tablet by mouth Daily.       magnesium citrate 1.745 GM/30ML solution solution      Take 30 mL by mouth 1 (One) Time.       meloxicam 15 MG tablet  Commonly known as: MOBIC      Take 1 tablet by mouth Daily.       metoprolol tartrate 50 MG tablet  Commonly known as: LOPRESSOR      Take 1 tablet by mouth.       mometasone 0.1 %  cream  Commonly known as: ELOCON      Apply 1 Application topically to the appropriate area as directed Daily.       omeprazole 40 MG capsule  Commonly known as: priLOSEC      Take 1 capsule by mouth Daily.       potassium chloride 10 MEQ CR tablet  Commonly known as: KLOR-CON M10      Take 1 tablet by mouth 2 (Two) Times a Day.       sennosides-docusate 8.6-50 MG per tablet  Commonly known as: PERICOLACE      Take 1 tablet by mouth Daily.       traZODone 50 MG tablet  Commonly known as: DESYREL      Take 1 tablet by mouth Daily.       venlafaxine  MG 24 hr capsule  Commonly known as: EFFEXOR-XR      Take 1 capsule by mouth Daily.                 Where to Get Your Medications        These medications were sent to [facility] [address] [place] [address] - [address] [place] [address] [phone]  - [phone]   [address] [facility] [address]      Phone: [phone]   ondansetron ODT 4 MG disintegrating tablet